# Patient Record
(demographics unavailable — no encounter records)

---

## 2024-12-31 NOTE — PHYSICAL EXAM
[Alert] : alert [No Acute Distress] : no acute distress [Normocephalic] : normocephalic [Conjunctivae with no discharge] : conjunctivae with no discharge [PERRL] : PERRL [EOMI Bilateral] : EOMI bilateral [Auricles Well Formed] : auricles well formed [Clear Tympanic membranes with present light reflex and bony landmarks] : clear tympanic membranes with present light reflex and bony landmarks [No Discharge] : no discharge [Nares Patent] : nares patent [Pink Nasal Mucosa] : pink nasal mucosa [Supple, full passive range of motion] : supple, full passive range of motion [No Palpable Masses] : no palpable masses [Symmetric Chest Rise] : symmetric chest rise [Clear to Auscultation Bilaterally] : clear to auscultation bilaterally [Regular Rate and Rhythm] : regular rate and rhythm [Normal S1, S2 present] : normal S1, S2 present [No Murmurs] : no murmurs [+2 Femoral Pulses] : +2 femoral pulses [Soft] : soft [NonTender] : non tender [Non Distended] : non distended [Normoactive Bowel Sounds] : normoactive bowel sounds [No Hepatomegaly] : no hepatomegaly [No Splenomegaly] : no splenomegaly [Patent] : patent [No fissures] : no fissures [No Abnormal Lymph Nodes Palpated] : no abnormal lymph nodes palpated [No Gait Asymmetry] : no gait asymmetry [No pain or deformities with palpation of bone, muscles, joints] : no pain or deformities with palpation of bone, muscles, joints [Normal Muscle Tone] : normal muscle tone [Straight] : straight [+2 Patella DTR] : +2 patella DTR [Cranial Nerves Grossly Intact] : cranial nerves grossly intact [No Rash or Lesions] : no rash or lesions [de-identified] : erythematous pharynx with palatal petechiae and enlarged tonsils

## 2024-12-31 NOTE — DEVELOPMENTAL MILESTONES
[Cuts most foods with a knife] : cuts most foods with a knife [Ties shoes] : ties shoes [Is dry day and night] : is dry day and night [Chooses preferred foods] : chooses preferred foods [Starts/continues conversation with peers] : starts/continues conversation with peers [Plays and interacts with at least one] : plays and interacts with at least one "best friend" [Tells a story with a beginning,] : tells a story with a beginning, a middle, and an end [Masters all consonant sounds and] : masters all consonant sounds and combinations, such as "d" or "ch" [Counts 10 objects] : counts 10 objects [Can do simple addition and] : can do simple addition and subtraction with objects [Rides a standard bike] : rides a standard bike [Hops on one foot 3 to 4 times] : hops on one foot 3 to 4 times [Catches small ball with] : catches small ball with 2 hands [Draw a 12-part person] : draw a 12-part person [Prints 3 or more simple words] : prints 3 or more simple words without copying [Writes first and last name in] : writes first and last name in uppercase or lowercase letters [Normal Development] : Normal Development [None] : none

## 2024-12-31 NOTE — HISTORY OF PRESENT ILLNESS
[Fruit] : fruit [Vegetables] : vegetables [Meat] : meat [Eggs] : eggs [Fish] : fish [Dairy] : dairy [___ voids per day] : [unfilled] voids per day [Normal] : Normal [Brushing teeth] : Brushing teeth [Yes] : Patient goes to dentist yearly [Playtime (60 min/d)] : Playtime 60 min a day [< 2 hrs of screen time] : Less than 2 hrs of screen time [Parent has appropriate responses to behavior] : Parent has appropriate responses to behavior [Grade ___] : Grade [unfilled] [No] : Not at  exposure [Water heater temperature set at <120 degrees F] : Water heater temperature set at <120 degrees F [Car seat in back seat] : Car seat in back seat [Carbon Monoxide Detectors] : Carbon monoxide detectors [Smoke Detectors] : Smoke detectors [Supervised outdoor play] : Supervised outdoor play [Mother] : mother [Appropiate parent-child-sibling interaction] : Appropriate parent-child-sibling interaction [Child Cooperates] : Child cooperates [Up to date] : Up to date [Exposure to electronic nicotine delivery system] : No exposure to electronic nicotine delivery system

## 2024-12-31 NOTE — DISCUSSION/SUMMARY
[School Readiness] : school readiness [Mental Health] : mental health [Nutrition and Physical Activity] : nutrition and physical activity [Oral Health] : oral health [Safety] : safety [Mother] : mother [Full Activity without restrictions including Physical Education & Athletics] : Full Activity without restrictions including Physical Education & Athletics [I have examined the above-named student and completed the preparticipation physical evaluation. The athlete does not present apparent clinical contraindications to practice and participate in sport(s) as outlined above. A copy of the physical exam is on r] : I have examined the above-named student and completed the preparticipation physical evaluation. The athlete does not present apparent clinical contraindications to practice and participate in sport(s) as outlined above. A copy of the physical exam is on record in my office and can be made available to the school at the request of the parents. If conditions arise after the athlete has been cleared for participation, the physician may rescind the clearance until the problem is resolved and the potential consequences are completely explained to the athlete (and parents/guardians). [FreeTextEntry1] : Patient with tonsillitis, Will start antibiotics and  will send throat culture to rule out strep. Also recommend supportive care with antipyretics, salt water gargles, and if age-appropriate throat lozenges. Fever control with tylenol and motrin. Drink plenty of fluids to try to keep well hydrated.  Continue balanced diet with all food groups. Brush teeth twice a day with toothbrush. Recommend visit to dentist. Help child to maintain consistent daily routines and sleep schedule. School discussed. Ensure home is safe. Teach child about personal safety. Use consistent, positive discipline. Limit screen time to no more than 2 hours per day. Encourage physical activity. Child needs to ride in a belt-positioning booster seat until  4 feet 9 inches has been reached and are between 8 and 12 years of age.   Return 1 year for routine well child check.